# Patient Record
Sex: FEMALE | URBAN - METROPOLITAN AREA
[De-identification: names, ages, dates, MRNs, and addresses within clinical notes are randomized per-mention and may not be internally consistent; named-entity substitution may affect disease eponyms.]

---

## 2017-10-27 ENCOUNTER — APPOINTMENT (RX ONLY)
Dept: URBAN - METROPOLITAN AREA CLINIC 60 | Facility: CLINIC | Age: 47
Setting detail: DERMATOLOGY
End: 2017-10-27

## 2017-10-27 DIAGNOSIS — Z41.9 ENCOUNTER FOR PROCEDURE FOR PURPOSES OTHER THAN REMEDYING HEALTH STATE, UNSPECIFIED: ICD-10-CM

## 2017-10-27 PROCEDURE — ? LIGHTSHEER

## 2017-10-27 NOTE — PROCEDURE: LIGHTSHEER
Total Pulses: 123
Fluence: 26
Spot Size: 9 mm
Hair Color (Optional): Brown
Laser Type: Kae Thapa (HS Handpiece)
External Cooling Fan Speed: 0
Detail Level: Detailed
Skin Type (Optional): III
Consent: Written consent obtained, risks reviewed including but not limited to crusting, scabbing, blistering, scarring, darker or lighter pigmentary change, paradoxical hair regrowth, incomplete removal of hair and infection.
Endpoint: Immediate endpoint: perifollicular erythema and edema. Post care reviewed with patient. Applied Vivier spf 30.
Hair Texture (Optional): coarse
Pulse Duration: 30 ms
Post-Care Instructions: I reviewed with the patient in detail post-care instructions. Patient should avoid sun for a minimum of 4 weeks before and after treatment.
Cooling: chill tip